# Patient Record
Sex: FEMALE | Race: WHITE | Employment: FULL TIME | URBAN - METROPOLITAN AREA
[De-identification: names, ages, dates, MRNs, and addresses within clinical notes are randomized per-mention and may not be internally consistent; named-entity substitution may affect disease eponyms.]

---

## 2020-02-13 ENCOUNTER — OFFICE VISIT (OUTPATIENT)
Dept: FAMILY MEDICINE CLINIC | Age: 60
End: 2020-02-13
Payer: COMMERCIAL

## 2020-02-13 VITALS — TEMPERATURE: 98.2 F | OXYGEN SATURATION: 98 % | HEART RATE: 82 BPM | BODY MASS INDEX: 21.26 KG/M2 | WEIGHT: 120 LBS

## 2020-02-13 LAB
INFLUENZA A ANTIBODY: NORMAL
INFLUENZA B ANTIBODY: NORMAL
S PYO AG THROAT QL: NORMAL

## 2020-02-13 PROCEDURE — 87804 INFLUENZA ASSAY W/OPTIC: CPT | Performed by: FAMILY MEDICINE

## 2020-02-13 PROCEDURE — 87880 STREP A ASSAY W/OPTIC: CPT | Performed by: FAMILY MEDICINE

## 2020-02-13 PROCEDURE — 99213 OFFICE O/P EST LOW 20 MIN: CPT | Performed by: FAMILY MEDICINE

## 2020-02-13 RX ORDER — GUAIFENESIN AND DEXTROMETHORPHAN HYDROBROMIDE 1200; 60 MG/1; MG/1
1 TABLET, EXTENDED RELEASE ORAL 2 TIMES DAILY
Qty: 14 TABLET | Refills: 1 | Status: SHIPPED | OUTPATIENT
Start: 2020-02-13

## 2020-02-13 RX ORDER — METHYLPREDNISOLONE 4 MG/1
TABLET ORAL
Qty: 1 KIT | Refills: 0 | Status: SHIPPED | OUTPATIENT
Start: 2020-02-13 | End: 2022-10-04 | Stop reason: ALTCHOICE

## 2020-02-13 RX ORDER — CEFDINIR 300 MG/1
300 CAPSULE ORAL 2 TIMES DAILY
Qty: 20 CAPSULE | Refills: 0 | Status: SHIPPED | OUTPATIENT
Start: 2020-02-13 | End: 2020-02-23

## 2020-02-13 RX ORDER — ATORVASTATIN CALCIUM 10 MG/1
TABLET, FILM COATED ORAL
COMMUNITY

## 2020-02-13 RX ORDER — CETIRIZINE HYDROCHLORIDE 10 MG/1
10 TABLET ORAL DAILY
Qty: 30 TABLET | Refills: 1 | Status: SHIPPED | OUTPATIENT
Start: 2020-02-13

## 2020-02-13 RX ORDER — HYDROCODONE BITARTRATE AND ACETAMINOPHEN 10; 325 MG/1; MG/1
TABLET ORAL
COMMUNITY
Start: 2019-11-09 | End: 2022-10-04 | Stop reason: ALTCHOICE

## 2020-02-13 ASSESSMENT — ENCOUNTER SYMPTOMS
SORE THROAT: 1
CHEST TIGHTNESS: 1
WHEEZING: 0
RHINORRHEA: 1
SINUS PAIN: 1
CHOKING: 0
SINUS PRESSURE: 1
COUGH: 1
SHORTNESS OF BREATH: 0

## 2020-02-13 NOTE — PROGRESS NOTES
20  Chase Rodas : 1960 Sex: female  Age: 62 y.o. Chief Complaint   Patient presents with    Cough     all sx 2d    Congestion    Drainage    Otalgia       24-year-old white female with a chief complaint of cough of 2 days duration nasal congestion postnasal drainage otalgia in both ears no nausea vomiting or dizziness. She does have myalgias no distress some chills fatigue and low-grade fever. Review of Systems   Constitutional: Positive for chills, fatigue and fever. HENT: Positive for congestion, ear pain, postnasal drip, rhinorrhea, sinus pressure, sinus pain, sneezing and sore throat. Respiratory: Positive for cough and chest tightness. Negative for choking, shortness of breath and wheezing. Cardiovascular: Negative. Current Outpatient Medications:     HYDROcodone-acetaminophen (NORCO)  MG per tablet, TAKE 1 TABLET BY MOUTH EVERY 4 HOURS AS NEEDED, Disp: , Rfl:     atorvastatin (LIPITOR) 10 MG tablet, Take by mouth, Disp: , Rfl:     acyclovir (ZOVIRAX) 200 MG capsule, TAKE ONE CAPSULE BY MOUTH EVERY 4 HOURS AS NEEDED, Disp: , Rfl: 0    atorvastatin (LIPITOR) 20 MG tablet, Take 20 mg by mouth daily. , Disp: , Rfl:   No Known Allergies    Past Medical History:   Diagnosis Date    Hyperlipemia      Past Surgical History:   Procedure Laterality Date    BREAST REDUCTION SURGERY      COSMETIC SURGERY      OVARIAN CYST REMOVAL      TONSILLECTOMY       No family history on file. Social History     Tobacco Use    Smoking status: Current Every Day Smoker     Packs/day: 0.50    Smokeless tobacco: Never Used   Substance Use Topics    Alcohol use: No     Comment: social    Drug use: No        Vitals:    20 1231   Pulse: 82   Temp: 98.2 °F (36.8 °C)   SpO2: 98%   Weight: 120 lb (54.4 kg)       Physical Exam  Constitutional:       Appearance: Normal appearance. HENT:      Head: Normocephalic and atraumatic.       Right Ear: Tympanic membrane, ear

## 2022-08-15 ENCOUNTER — TELEPHONE (OUTPATIENT)
Dept: VASCULAR SURGERY | Age: 62
End: 2022-08-15

## 2022-08-15 NOTE — TELEPHONE ENCOUNTER
Referral for JEEVAN received from Dr. Bob Abbott, message left on pt's voicemail for a return call to schedule.

## 2022-10-03 ENCOUNTER — TELEPHONE (OUTPATIENT)
Dept: VASCULAR SURGERY | Age: 62
End: 2022-10-03

## 2022-10-04 ENCOUNTER — OFFICE VISIT (OUTPATIENT)
Dept: VASCULAR SURGERY | Age: 62
End: 2022-10-04
Payer: COMMERCIAL

## 2022-10-04 VITALS — HEIGHT: 63 IN | WEIGHT: 125 LBS | BODY MASS INDEX: 22.15 KG/M2

## 2022-10-04 DIAGNOSIS — I70.1 STENOSIS OF LEFT RENAL ARTERY (HCC): Primary | ICD-10-CM

## 2022-10-04 PROCEDURE — 99203 OFFICE O/P NEW LOW 30 MIN: CPT | Performed by: SURGERY

## 2022-10-04 NOTE — PROGRESS NOTES
Vascular Surgery Outpatient Consultation      Chief Complaint   Patient presents with    Consultation     New pt. JEEVAN       Reason for Consult: Renal artery stenosis    Requesting Physician:  Dr. Cleveland Phillips:                The patient is a 57 y.o. female who is referred for evaluation of renal artery stenosis. She states a history of hypertension and as part of her work-up he underwent a renal ultrasound that suggested a moderate stenosis involving the left renal artery. She states that her blood pressure has been under good control now that she is on medications. She states that she is on 1 blood pressure medicine. Past Medical History:        Diagnosis Date    Hyperlipemia     JEEVAN (renal artery stenosis) (HCC)      Past Surgical History:        Procedure Laterality Date    BREAST REDUCTION SURGERY      COSMETIC SURGERY      OVARIAN CYST REMOVAL      TONSILLECTOMY       Current Medications:   Prior to Admission medications    Medication Sig Start Date End Date Taking? Authorizing Provider   atorvastatin (LIPITOR) 10 MG tablet Take by mouth   Yes Historical Provider, MD   cetirizine (ZYRTEC) 10 MG tablet Take 1 tablet by mouth daily 2/13/20  Yes Leslie Luna, DO   Dextromethorphan-guaiFENesin [facility] DM MAXIMUM STRENGTH)  MG TB12 Take 1 tablet by mouth 2 times daily 2/13/20  Yes Steve Paez, DO   acyclovir (ZOVIRAX) 200 MG capsule TAKE ONE CAPSULE BY MOUTH EVERY 4 HOURS AS NEEDED 3/9/17  Yes Historical Provider, MD   atorvastatin (LIPITOR) 20 MG tablet Take 20 mg by mouth daily. Yes Historical Provider, MD     Allergies:  Patient has no known allergies.     Social History     Socioeconomic History    Marital status: Single     Spouse name: Not on file    Number of children: Not on file    Years of education: Not on file    Highest education level: Not on file   Occupational History    Not on file   Tobacco Use    Smoking status: Every Day     Packs/day: 0.50 Types: Cigarettes    Smokeless tobacco: Never   Substance and Sexual Activity    Alcohol use: Yes     Comment: social    Drug use: No    Sexual activity: Yes     Partners: Male   Other Topics Concern    Not on file   Social History Narrative    Not on file     Social Determinants of Health     Financial Resource Strain: Not on file   Food Insecurity: Not on file   Transportation Needs: Not on file   Physical Activity: Not on file   Stress: Not on file   Social Connections: Not on file   Intimate Partner Violence: Not on file   Housing Stability: Not on file        No family history on file.     REVIEW OF SYSTEMS (New symptoms):    Eyes:      Blurred vision:  No [x]/Yes []               Diplopia:   No [x]/Yes []               Vision loss:       No [x]/Yes []   Ears, nose, throat:             Hearing loss:    No [x]/Yes []      Vertigo:   No [x]/Yes []                       Swallowing problem:  No [x]/Yes []               Nose bleeds:   No [x]/Yes []      Voice hoarseness:  No [x]/Yes []  Respiratory:             Cough:   No [x]/Yes []      Pleuritic chest pain:  No [x]/Yes []                        Dyspnea:   No [x]/Yes []      Wheezing:   No [x]/Yes []  Cardiovascular:             Angina:   No [x]/Yes []      Palpitations:   No [x]/Yes []          Claudication:    No [x]/Yes []      Leg swelling:   No [x]/Yes []  Gastrointestinal:             Nausea or vomiting:  No [x]/Yes []               Abdominal pain:  No [x]/Yes []                     Intestinal bleeding: No [x]/Yes []  Musculoskeletal:             Leg pain:   No [x]/Yes []      Back pain:   No [x]/Yes []                    Weakness:   No [x]/Yes []  Neurologic:             Numbness:   No [x]/Yes []      Paralysis:   No [x]/Yes []                       Headaches:   No [x]/Yes []  Hematologic, lymphatic:   Anemia:   No [x]/Yes []              Bleeding or bruising:  No [x]/Yes []              Fevers or chills: No [x]/Yes []  Endocrine:             Temp intolerance:   No [x]/Yes []                       Polydipsia, polyuria:  No [x]/Yes []  Skin:              Rash:    No [x]/Yes []      Ulcers:   No [x]/Yes []              Abnorm pigment: No [x]/Yes []  :              Frequency/urgency:  No [x]/Yes []      Hematuria:    No [x]/Yes []                      Incontinence:    No [x]/Yes []    PHYSICAL EXAM:  There were no vitals filed for this visit. General Appearance: alert and oriented to person, place and time, well developed and well- nourished, in no acute distress  Skin: warm and dry, no rash or erythema  Head: normocephalic and atraumatic  Eyes: extraocular eye movements intact, conjunctivae normal  ENT: external ear and ear canal normal bilaterally, nose without deformity  Pulmonary/Chest: clear to auscultation bilaterally- no wheezes, rales or rhonchi, normal air movement, no respiratory distress  Cardiovascular: normal rate, regular rhythm, normal S1 and S2, no murmurs, no carotid bruits  Abdomen: soft, non-tender, non-distended, normal bowel sounds, no masses or organomegaly  Musculoskeletal: normal range of motion, no joint swelling, deformity or tenderness  Neurologic: no cranial nerve deficit, gait, coordination and speech normal  Extremities: no leg edema bilaterally    PULSE EXAM      Right      Left   Brachial     Radial 2 2   Femoral     Popliteal     Dorsalis Pedis     Posterior Tibial     (3=normal, 2=diminished, 1=barely palpable, 4=widened)      Problem List Items Addressed This Visit       Stenosis of left renal artery (HCC) - Primary    Relevant Orders    US RETROPERITONEAL JEEVAN         I reviewed the ultrasound and CAT scan from [facility].  The patient does have a stenosis at the origin of the left renal artery however does not appear severe. I reviewed with the patient that I do not recommend any additional testing or intervention currently.   As she is well controlled on one blood pressure medication and has no renal dysfunction, she does not require catheter directed arteriogram.    I reviewed with her that if she has new difficulty controlling her blood pressure or changes in her renal function, we can always consider an arteriogram at that time. She understands and agrees. No follow-ups on file.

## 2023-08-28 ENCOUNTER — TELEPHONE (OUTPATIENT)
Dept: VASCULAR SURGERY | Age: 63
End: 2023-08-28

## 2023-08-28 NOTE — TELEPHONE ENCOUNTER
Spoke with the pt to schedule a renal artery duplex prior to her next office visit with Dr. Shanta Cuevas. Due to financial reasons, she did not wish to proceed with duplex and cancelled her follow up with Dr. Shanta Cuevas stating she would call to reschedule.

## 2024-02-13 ENCOUNTER — APPOINTMENT (OUTPATIENT)
Dept: GENERAL RADIOLOGY | Age: 64
End: 2024-02-13
Payer: MEDICARE

## 2024-02-13 ENCOUNTER — APPOINTMENT (OUTPATIENT)
Dept: CT IMAGING | Age: 64
End: 2024-02-13
Payer: MEDICARE

## 2024-02-13 ENCOUNTER — HOSPITAL ENCOUNTER (EMERGENCY)
Age: 64
Discharge: HOME | End: 2024-02-14
Attending: EMERGENCY MEDICINE | Admitting: INTERNAL MEDICINE
Payer: MEDICARE

## 2024-02-13 ENCOUNTER — APPOINTMENT (OUTPATIENT)
Dept: MRI IMAGING | Age: 64
End: 2024-02-13
Payer: MEDICARE

## 2024-02-13 DIAGNOSIS — R29.810 FACIAL DROOP: Primary | ICD-10-CM

## 2024-02-13 DIAGNOSIS — G51.0 BELL'S PALSY: ICD-10-CM

## 2024-02-13 LAB
ALBUMIN SERPL-MCNC: 4.1 G/DL (ref 3.5–5.2)
ALP SERPL-CCNC: 61 U/L (ref 35–104)
ALT SERPL-CCNC: 28 U/L (ref 0–32)
ANION GAP SERPL CALCULATED.3IONS-SCNC: 11 MMOL/L (ref 7–16)
AST SERPL-CCNC: 32 U/L (ref 0–31)
BASOPHILS # BLD: 0.04 K/UL (ref 0–0.2)
BASOPHILS NFR BLD: 0 % (ref 0–2)
BILIRUB SERPL-MCNC: 0.5 MG/DL (ref 0–1.2)
BUN SERPL-MCNC: 15 MG/DL (ref 6–23)
CALCIUM SERPL-MCNC: 8.1 MG/DL (ref 8.6–10.2)
CHLORIDE SERPL-SCNC: 101 MMOL/L (ref 98–107)
CO2 SERPL-SCNC: 23 MMOL/L (ref 22–29)
CREAT SERPL-MCNC: 0.6 MG/DL (ref 0.5–1)
EKG ATRIAL RATE: 93 BPM
EKG P AXIS: 49 DEGREES
EKG P-R INTERVAL: 146 MS
EKG Q-T INTERVAL: 390 MS
EKG QRS DURATION: 78 MS
EKG QTC CALCULATION (BAZETT): 484 MS
EKG R AXIS: 37 DEGREES
EKG T AXIS: 39 DEGREES
EKG VENTRICULAR RATE: 93 BPM
EOSINOPHIL # BLD: 0.06 K/UL (ref 0.05–0.5)
EOSINOPHILS RELATIVE PERCENT: 1 % (ref 0–6)
ERYTHROCYTE [DISTWIDTH] IN BLOOD BY AUTOMATED COUNT: 14.3 % (ref 11.5–15)
GFR SERPL CREATININE-BSD FRML MDRD: >60 ML/MIN/1.73M2
GLUCOSE BLD-MCNC: 108 MG/DL (ref 74–99)
GLUCOSE SERPL-MCNC: 103 MG/DL (ref 74–99)
HCT VFR BLD AUTO: 34.9 % (ref 34–48)
HGB BLD-MCNC: 11.6 G/DL (ref 11.5–15.5)
IMM GRANULOCYTES # BLD AUTO: <0.03 K/UL (ref 0–0.58)
IMM GRANULOCYTES NFR BLD: 0 % (ref 0–5)
LYMPHOCYTES NFR BLD: 2.64 K/UL (ref 1.5–4)
LYMPHOCYTES RELATIVE PERCENT: 28 % (ref 20–42)
MCH RBC QN AUTO: 31.3 PG (ref 26–35)
MCHC RBC AUTO-ENTMCNC: 33.2 G/DL (ref 32–34.5)
MCV RBC AUTO: 94.1 FL (ref 80–99.9)
MONOCYTES NFR BLD: 0.74 K/UL (ref 0.1–0.95)
MONOCYTES NFR BLD: 8 % (ref 2–12)
NEUTROPHILS NFR BLD: 63 % (ref 43–80)
NEUTS SEG NFR BLD: 5.99 K/UL (ref 1.8–7.3)
PLATELET # BLD AUTO: 231 K/UL (ref 130–450)
PMV BLD AUTO: 12.4 FL (ref 7–12)
POTASSIUM SERPL-SCNC: 3.3 MMOL/L (ref 3.5–5)
PROT SERPL-MCNC: 6.4 G/DL (ref 6.4–8.3)
RBC # BLD AUTO: 3.71 M/UL (ref 3.5–5.5)
SODIUM SERPL-SCNC: 135 MMOL/L (ref 132–146)
TROPONIN I SERPL HS-MCNC: <6 NG/L (ref 0–9)
WBC OTHER # BLD: 9.5 K/UL (ref 4.5–11.5)

## 2024-02-13 PROCEDURE — 6360000004 HC RX CONTRAST MEDICATION: Performed by: RADIOLOGY

## 2024-02-13 PROCEDURE — 93010 ELECTROCARDIOGRAM REPORT: CPT | Performed by: INTERNAL MEDICINE

## 2024-02-13 PROCEDURE — 99285 EMERGENCY DEPT VISIT HI MDM: CPT

## 2024-02-13 PROCEDURE — 6360000002 HC RX W HCPCS: Performed by: EMERGENCY MEDICINE

## 2024-02-13 PROCEDURE — 6370000000 HC RX 637 (ALT 250 FOR IP)

## 2024-02-13 PROCEDURE — 70498 CT ANGIOGRAPHY NECK: CPT

## 2024-02-13 PROCEDURE — 70496 CT ANGIOGRAPHY HEAD: CPT

## 2024-02-13 PROCEDURE — 85025 COMPLETE CBC W/AUTO DIFF WBC: CPT

## 2024-02-13 PROCEDURE — 82962 GLUCOSE BLOOD TEST: CPT

## 2024-02-13 PROCEDURE — 84484 ASSAY OF TROPONIN QUANT: CPT

## 2024-02-13 PROCEDURE — 80053 COMPREHEN METABOLIC PANEL: CPT

## 2024-02-13 PROCEDURE — 70450 CT HEAD/BRAIN W/O DYE: CPT

## 2024-02-13 PROCEDURE — 70551 MRI BRAIN STEM W/O DYE: CPT

## 2024-02-13 PROCEDURE — 93005 ELECTROCARDIOGRAM TRACING: CPT

## 2024-02-13 PROCEDURE — 71045 X-RAY EXAM CHEST 1 VIEW: CPT

## 2024-02-13 PROCEDURE — 96376 TX/PRO/DX INJ SAME DRUG ADON: CPT

## 2024-02-13 PROCEDURE — 96374 THER/PROPH/DIAG INJ IV PUSH: CPT

## 2024-02-13 RX ORDER — ONDANSETRON 2 MG/ML
4 INJECTION INTRAMUSCULAR; INTRAVENOUS EVERY 6 HOURS PRN
OUTPATIENT
Start: 2024-02-13

## 2024-02-13 RX ORDER — LORAZEPAM 2 MG/ML
0.5 INJECTION INTRAMUSCULAR ONCE
Status: COMPLETED | OUTPATIENT
Start: 2024-02-13 | End: 2024-02-13

## 2024-02-13 RX ORDER — SODIUM CHLORIDE 0.9 % (FLUSH) 0.9 %
10 SYRINGE (ML) INJECTION ONCE
Status: DISCONTINUED | OUTPATIENT
Start: 2024-02-13 | End: 2024-02-13

## 2024-02-13 RX ORDER — VALACYCLOVIR HYDROCHLORIDE 1 G/1
TABLET, FILM COATED ORAL
COMMUNITY
Start: 2024-02-05 | End: 2024-02-13 | Stop reason: ALTCHOICE

## 2024-02-13 RX ORDER — ASPIRIN 81 MG/1
162 TABLET ORAL DAILY
Status: DISCONTINUED | OUTPATIENT
Start: 2024-02-14 | End: 2024-02-14 | Stop reason: HOSPADM

## 2024-02-13 RX ORDER — ASPIRIN 81 MG/1
81 TABLET, CHEWABLE ORAL DAILY
OUTPATIENT
Start: 2024-02-13

## 2024-02-13 RX ORDER — CLOPIDOGREL BISULFATE 75 MG/1
75 TABLET ORAL DAILY
Status: DISCONTINUED | OUTPATIENT
Start: 2024-02-14 | End: 2024-02-14 | Stop reason: HOSPADM

## 2024-02-13 RX ORDER — ASPIRIN 300 MG/1
300 SUPPOSITORY RECTAL DAILY
OUTPATIENT
Start: 2024-02-13

## 2024-02-13 RX ORDER — SODIUM CHLORIDE 9 MG/ML
INJECTION, SOLUTION INTRAVENOUS PRN
OUTPATIENT
Start: 2024-02-13

## 2024-02-13 RX ORDER — POLYETHYLENE GLYCOL 3350 17 G/17G
17 POWDER, FOR SOLUTION ORAL DAILY PRN
OUTPATIENT
Start: 2024-02-13

## 2024-02-13 RX ORDER — ONDANSETRON 4 MG/1
4 TABLET, ORALLY DISINTEGRATING ORAL EVERY 8 HOURS PRN
OUTPATIENT
Start: 2024-02-13

## 2024-02-13 RX ORDER — SODIUM CHLORIDE 9 MG/ML
INJECTION, SOLUTION INTRAVENOUS PRN
Status: DISCONTINUED | OUTPATIENT
Start: 2024-02-13 | End: 2024-02-13

## 2024-02-13 RX ORDER — ATORVASTATIN CALCIUM 20 MG/1
20 TABLET, FILM COATED ORAL DAILY
OUTPATIENT
Start: 2024-02-13

## 2024-02-13 RX ORDER — CLOPIDOGREL BISULFATE 75 MG/1
300 TABLET ORAL ONCE
Status: COMPLETED | OUTPATIENT
Start: 2024-02-13 | End: 2024-02-13

## 2024-02-13 RX ORDER — SODIUM CHLORIDE 0.9 % (FLUSH) 0.9 %
5-40 SYRINGE (ML) INJECTION PRN
Status: DISCONTINUED | OUTPATIENT
Start: 2024-02-13 | End: 2024-02-13

## 2024-02-13 RX ORDER — ROSUVASTATIN CALCIUM 20 MG/1
20 TABLET, COATED ORAL EVERY MORNING
COMMUNITY

## 2024-02-13 RX ORDER — ENOXAPARIN SODIUM 100 MG/ML
40 INJECTION SUBCUTANEOUS DAILY
OUTPATIENT
Start: 2024-02-13

## 2024-02-13 RX ORDER — ASPIRIN 325 MG
325 TABLET, DELAYED RELEASE (ENTERIC COATED) ORAL ONCE
Status: COMPLETED | OUTPATIENT
Start: 2024-02-13 | End: 2024-02-13

## 2024-02-13 RX ORDER — SODIUM CHLORIDE 0.9 % (FLUSH) 0.9 %
5-40 SYRINGE (ML) INJECTION EVERY 12 HOURS SCHEDULED
OUTPATIENT
Start: 2024-02-13

## 2024-02-13 RX ORDER — SODIUM CHLORIDE 0.9 % (FLUSH) 0.9 %
5-40 SYRINGE (ML) INJECTION EVERY 12 HOURS SCHEDULED
Status: DISCONTINUED | OUTPATIENT
Start: 2024-02-13 | End: 2024-02-13

## 2024-02-13 RX ORDER — VALSARTAN 160 MG/1
160 TABLET ORAL EVERY MORNING
COMMUNITY

## 2024-02-13 RX ORDER — SODIUM CHLORIDE 0.9 % (FLUSH) 0.9 %
5-40 SYRINGE (ML) INJECTION PRN
OUTPATIENT
Start: 2024-02-13

## 2024-02-13 RX ADMIN — POTASSIUM BICARBONATE 20 MEQ: 782 TABLET, EFFERVESCENT ORAL at 13:09

## 2024-02-13 RX ADMIN — ASPIRIN 325 MG: 325 TABLET, COATED ORAL at 12:50

## 2024-02-13 RX ADMIN — LORAZEPAM 0.5 MG: 2 INJECTION INTRAMUSCULAR; INTRAVENOUS at 23:10

## 2024-02-13 RX ADMIN — CLOPIDOGREL BISULFATE 300 MG: 75 TABLET ORAL at 12:50

## 2024-02-13 RX ADMIN — IOPAMIDOL 75 ML: 755 INJECTION, SOLUTION INTRAVENOUS at 11:55

## 2024-02-13 RX ADMIN — LORAZEPAM 0.5 MG: 2 INJECTION INTRAMUSCULAR; INTRAVENOUS at 15:27

## 2024-02-13 ASSESSMENT — LIFESTYLE VARIABLES
HOW OFTEN DO YOU HAVE A DRINK CONTAINING ALCOHOL: 2-4 TIMES A MONTH
HOW MANY STANDARD DRINKS CONTAINING ALCOHOL DO YOU HAVE ON A TYPICAL DAY: 1 OR 2

## 2024-02-13 NOTE — ED PROVIDER NOTES
[facility] EMERGENCY DEPARTMENT  EMERGENCY DEPARTMENT ENCOUNTER        Pt Name: Sandra L Devicchio  MRN: [id number]  Birthdate 1960  Date of evaluation: 2/13/2024  Provider: Alan Tomlinson DO  PCP: Jaquan Louise DO  Note Started: 12:02 PM EST 2/13/24    CHIEF COMPLAINT       Chief Complaint   Patient presents with    Aphasia    Facial Droop     slurred speech. facial droop. LKW 1hr PTA       HISTORY OF PRESENT ILLNESS: 1 or more Elements   History From: Patient and friend present    Limitations to history : None    Sandra L Devicchio is a 63 y.o. female who presents to the emergency department with chief complaint of facial drooping and slurred speech.  Patient states that started about 45 minutes prior to arrival in the emergency department.  She states that it is left sided mouth drooping as well as little bit of slurring of her speech.  She has not had this before.  Patient does not take any blood thinners and has not had any recent falls or head injuries.  Patient otherwise has no other symptoms including numbness, weakness, or balance difficulties.    Patient denies any headache, lightheadedness or dizziness, fever or chills, nausea or vomiting, chest pain, shortness of breath, abdominal pain, hematuria or dysuria, constipation or diarrhea.    Nursing Notes were all reviewed and agreed with or any disagreements were addressed in the HPI.    REVIEW OF SYSTEMS :      Positives and Pertinent negatives as per HPI.     PAST MEDICAL HISTORY/Chronic Conditions Affecting Care      has a past medical history of Hyperlipemia and JEEVAN (renal artery stenosis) (HCC).     SURGICAL HISTORY     Past Surgical History:   Procedure Laterality Date    BREAST REDUCTION SURGERY      COSMETIC SURGERY      OVARIAN CYST REMOVAL      TONSILLECTOMY         CURRENTMEDICATIONS       Previous Medications    ACYCLOVIR (ZOVIRAX) 200 MG CAPSULE    TAKE ONE CAPSULE BY MOUTH EVERY 4 HOURS AS NEEDED     drooping that started approximately an hour prior to arrival.  Patient has not had the symptoms before.  She does not take any anticoagulation.  NIH at arrival is 3 for slurred speech and facial drooping.  Stroke alert is called and patient is taken to CT's.  No acute intracranial hemorrhage evident and spoke with Dr. Phipps, teleneurology, who at this time initially recommends TNK and then on evaluating patient recommends no TNK or thrombectomy but does recommend loading with aspirin and Plavix and plan for MRI and transfer to [facility] for neurology evaluation.  Patient has remained hemodynamically stable throughout the emergency department course.  Mild hypokalemia and patient is given potassium replacement.  CBC and CMP normal with normal troponin and EKG.  Chest x-ray normal and CT head and neck normal as well.  Spoke with hospitalist who accepts patient for transfer.    Extensively reviewed laboratory and imaging findings with patient/family members/available representative parties and all questions answered at this time to the fullest extent possible.  Shared decision making utilized with patient and present parties, and due to their presenting conditions patient to be admitted to the hospital for inpatient management and monitoring.  Patient has remained closely monitored with multiple reevaluations and complex medical decision making throughout the course of their emergency department stay.  They have clinically remained stable and have hemodynamically remained stable as well.  After emergency department management is complete, patient to be admitted to the hospital.    Differential diagnosis includes but is not limited to: Acute intracranial hemorrhage, cerebrovascular accident, metabolic encephalopathy, transient ischemic attack    Please refer to the ED Course as available for additional MDM.  ED Course as of 02/13/24 1611   Tue Feb 13, 2024   1149 NIH 3 - last known well 11AM [RW]   1212

## 2024-02-13 NOTE — ED NOTES
Spoke with Dr. Morfin about ordering home medication. He is deferring ordering home medications to the admitting team.

## 2024-02-13 NOTE — ED NOTES
Spoke with Dr. Tomlinson about needing order to continue plavix while patient is in our facility. Medication ordered

## 2024-02-13 NOTE — ED NOTES
Notified Soledad in MRI patient has pre-meds ordered and is ready for MRI. She will call when they are ready for patient.

## 2024-02-13 NOTE — ED NOTES
Radiology Procedure Waiver   Name: Sandra L Devicchio  : 1960  MRN: [id number]    Date:  24    Time: 11:47 AM EST    Benefits of immediately proceeding with Radiology exam(s) without pre-testing outweigh the risks or are not indicated as specified below and therefore the following is/are being waived:    [] Pregnancy test   [] Patients LMP on-time and regular.   [] Patient had Tubal Ligation or has other Contraception Device.   [] Patient  is Menopausal or Premenarcheal.    [] Patient had Full or Partial Hysterectomy.    [] Protocol for Iodine allergy    [] MRI Questionnaire     [x] BUN/Creatinine   [] Patient age w/no hx of renal dysfunction.   [] Patient on Dialysis.   [] Recent Normal Labs.  Electronically signed by Alan Tomlinson DO on 24 at 11:47 AM EST

## 2024-02-14 VITALS
DIASTOLIC BLOOD PRESSURE: 57 MMHG | TEMPERATURE: 98.4 F | HEIGHT: 63 IN | SYSTOLIC BLOOD PRESSURE: 108 MMHG | WEIGHT: 126 LBS | BODY MASS INDEX: 22.32 KG/M2 | OXYGEN SATURATION: 98 % | RESPIRATION RATE: 18 BRPM | HEART RATE: 61 BPM

## 2024-02-14 PROCEDURE — 6370000000 HC RX 637 (ALT 250 FOR IP)

## 2024-02-14 RX ORDER — ACYCLOVIR 800 MG/1
800 TABLET ORAL 4 TIMES DAILY
Qty: 40 TABLET | Refills: 0 | Status: SHIPPED | OUTPATIENT
Start: 2024-02-14 | End: 2024-02-24

## 2024-02-14 RX ORDER — METHYLPREDNISOLONE 4 MG/1
TABLET ORAL
Qty: 21 TABLET | Refills: 0 | Status: SHIPPED | OUTPATIENT
Start: 2024-02-14 | End: 2024-02-20

## 2024-02-14 RX ADMIN — ASPIRIN 162 MG: 81 TABLET, COATED ORAL at 08:53

## 2024-02-14 RX ADMIN — CLOPIDOGREL BISULFATE 75 MG: 75 TABLET ORAL at 08:53

## 2024-02-14 NOTE — ED NOTES
Patient offered breakfast tray. Patient declined at this time. RN provided coffee for patient and patient told to let RN know if she changes her mind about breakfast.

## 2024-02-14 NOTE — H&P
[facility]  History and Physical      CHIEF COMPLAINT:    Chief Complaint   Patient presents with    Aphasia    Facial Droop     slurred speech. facial droop. LKW 1hr PTA        Patient of Jaquan Louise DO presents with:  <principal problem not specified>    History of Present Illness:   Patient is a 63-year-old female with a past medical history of HLD and hypertension who presents to the emergency room for aphasia with facial drooping prompting her to come to the emergency room for further evaluation.  On arrival patient had a series of CT scans which revealed no acute infarct/hemorrhage or LVO.  Patient was a telestroke in which neurology recommended MRI of the brain and transfer to [place] for general neurology consult.  Patient was mildly hypertensive on arrival at 164/91.  Labs are relatively unremarkable on arrival.      REVIEW OF SYSTEMS:  Pertinent negatives are above in HPI.  10 point ROS otherwise negative.      Past Medical History:   Diagnosis Date    Hyperlipemia     JEEVAN (renal artery stenosis) (HCC)          Past Surgical History:   Procedure Laterality Date    BREAST REDUCTION SURGERY      COSMETIC SURGERY      OVARIAN CYST REMOVAL      TONSILLECTOMY         Medications Prior to Admission:    Not in a hospital admission.    Note that the patient's home medications were reviewed and the above list is accurate to the best of my knowledge at the time of the exam.    Allergies:    Patient has no known allergies.    Social History:    reports that she has been smoking cigarettes. She has never used smokeless tobacco. She reports current alcohol use. She reports that she does not use drugs.    Family History:   family history is not on file.      PHYSICAL EXAM:    Vitals:  BP (!) 108/57   Pulse 61   Temp 98.4 °F (36.9 °C)   Resp 18   Ht 1.6 m (5' 3\")   Wt 57.2 kg (126 lb)   SpO2 98%   BMI 22.32 kg/m²       General appearance: NAD, conversant  Eyes: Sclerae anicteric,  PERRLA  HEENT: AT/NC, MMM  Neck: FROM, supple, no thyromegaly  Lymph: No cervical / supraclavicular lymphadenopathy  Lungs: Clear to auscultation, WOB normal  CV: RRR, no MRGs, no lower extremity edema  Abdomen: Soft, non-tender; no masses or HSM, +BS  Extremities: FROM without synovitis.  No clubbing or cyanosis of the hands.  Skin: no rash, induration, lesions, or ulcers  Psych: Calm and cooperative.  Normal judgement and insight.  Normal mood and affect.  Neuro: Alert and interactive, face symmetric, speech fluent.    LABS:  All labs reviewed.  Of note:  CBC:   Lab Results   Component Value Date/Time    WBC 9.5 02/13/2024 12:01 PM    RBC 3.71 02/13/2024 12:01 PM    HGB 11.6 02/13/2024 12:01 PM    HCT 34.9 02/13/2024 12:01 PM    MCV 94.1 02/13/2024 12:01 PM    MCH 31.3 02/13/2024 12:01 PM    MCHC 33.2 02/13/2024 12:01 PM    RDW 14.3 02/13/2024 12:01 PM     02/13/2024 12:01 PM    MPV 12.4 02/13/2024 12:01 PM     CMP:    Lab Results   Component Value Date/Time     02/13/2024 12:01 PM    K 3.3 02/13/2024 12:01 PM     02/13/2024 12:01 PM    CO2 23 02/13/2024 12:01 PM    BUN 15 02/13/2024 12:01 PM    CREATININE 0.6 02/13/2024 12:01 PM    LABGLOM >60 02/13/2024 12:01 PM    GLUCOSE 103 02/13/2024 12:01 PM    PROT 6.4 02/13/2024 12:01 PM    LABALBU 4.1 02/13/2024 12:01 PM    CALCIUM 8.1 02/13/2024 12:01 PM    BILITOT 0.5 02/13/2024 12:01 PM    ALKPHOS 61 02/13/2024 12:01 PM    AST 32 02/13/2024 12:01 PM    ALT 28 02/13/2024 12:01 PM       Imaging:  CTA head/neck: No LVO, 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   CT head negative  MRI of the brain negative for acute infarcts    EKG:  Normal sinus rhythm    Telemetry:  I've personally reviewed the patient's telemetry:  ***    ASSESSMENT/PLAN:  Active Problems:    * No active hospital problems. *  Resolved Problems:    * No resolved hospital problems. *    Patient is a 63-year-old female admitted to [facility] for  Strokelike

## 2024-02-15 NOTE — CONSULTS
[place] [address]                                  CONSULTATION    PATIENT NAME: DEVICCHIO, SANDRA L                 :        1960  MED REC NO:   [id number]                            ROOM:       03  ACCOUNT NO:   [id number]                           ADMIT DATE: 2024  PROVIDER:     Jaquan Louise DO    CONSULT DATE:  2024    CHIEF COMPLAINT:  Right-sided facial droop.    HISTORY OF PRESENT ILLNESS:  This is a 63-year-old female who presented  to [place] [facility] the night before my examination with  acute onset of right-sided facial droop with watering of her right eye.   There was concern obviously for an acute neurological event, therefore  workup while in the emergency room consisted initially of CT scan of the  head, which showed no acute intracranial abnormality, specifically no  acute intracranial hemorrhage.  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  Laboratory studies included CBC, which revealed a normal white  cell count of 9.5 with hemoglobin and hematocrit stable at 11.6 and 34.9  respectively.  Her CMP did show a slight decrease in potassium at 3.3  with glucose slightly high at 103 and calcium slightly low at 8.1.   Remainder of CMP essentially within normal limits.  Her troponin was  less than 6.  Chest x-ray was showing no acute cardiopulmonary process.   Ultimately MRI of the brain would be ordered, which showed no acute  intracranial abnormality.  Findings of a normal-appearing brain for the  patient's age and only mild age appropriate atrophy and mild age  appropriate small vessel ischemic changes.  EKG revealed normal sinus  rhythm and according to Cardiology was showing normal EKG.  She did have  rises in blood pressure  upon arriving at the emergency room, however,  her blood pressure did stabilize.  Initially it was felt that this  patient maybe suffering from TIA and arrangements were made for transfer  to [facility] for a neurological input.  I was asked to see the patient  because transfer was held secondary to lack of available beds.  I saw  her while still at [facility].  This patient was walking around  effortlessly.  She did tell me that upon initial presentation, she had  difficulty with a few words, but felt that this was secondary to the  swelling that she had in her lip.  She otherwise denied having had any  chest pain, shortness of breath, abdominal pain, nausea, vomiting,  headaches, or dizziness.  She had no blurred vision.  No recent fever or  chills or signs of infection and states that she was feeling well prior  to and was feeling well at the time of my examination.  She only states  that she had watering in her eye and noticed that the right side of her  face was drooping, had no pain preceding this.  She did, however, have  what was an upper respiratory infection approximately two weeks ago.   She was very anxious to be discharged and it appeared fairly evident  that her symptoms were consistent with Bell's palsy, although it did  seem appropriate that initially ruling out acute cerebrovascular event  was necessary.  Nonetheless, the patient stated that she felt completely  normal other than her facial droop and therefore I felt that she was  appropriate for discharge.    PAST MEDICAL HISTORY:  Consists of hyperlipidemia and renal artery  stenosis.    PAST SURGICAL HISTORY:  Breast reduction surgery, cosmetics surgery of  the face, ovarian cyst removal, and tonsillectomy.    MEDICATIONS:  She typically takes Diovan, Crestor, and was given aspirin  and Plavix while in the emergency room.    SOCIAL HISTORY:  Current everyday smoker of half pack per year.   Uncertain as to her start day.  Does drink alcohol

## 2025-01-29 ENCOUNTER — OFFICE VISIT (OUTPATIENT)
Dept: PODIATRY | Age: 65
End: 2025-01-29
Payer: MEDICARE

## 2025-01-29 VITALS
OXYGEN SATURATION: 99 % | WEIGHT: 134 LBS | DIASTOLIC BLOOD PRESSURE: 84 MMHG | HEART RATE: 66 BPM | SYSTOLIC BLOOD PRESSURE: 137 MMHG | BODY MASS INDEX: 23.74 KG/M2 | TEMPERATURE: 97.6 F

## 2025-01-29 DIAGNOSIS — M20.41 HAMMER TOES OF BOTH FEET: ICD-10-CM

## 2025-01-29 DIAGNOSIS — M20.42 HAMMER TOES OF BOTH FEET: ICD-10-CM

## 2025-01-29 DIAGNOSIS — G57.62 MORTON'S NEUROMA OF LEFT FOOT: ICD-10-CM

## 2025-01-29 DIAGNOSIS — M79.672 PAIN IN LEFT FOOT: Primary | ICD-10-CM

## 2025-01-29 PROCEDURE — 1123F ACP DISCUSS/DSCN MKR DOCD: CPT | Performed by: PODIATRIST

## 2025-01-29 PROCEDURE — 1159F MED LIST DOCD IN RCRD: CPT | Performed by: PODIATRIST

## 2025-01-29 PROCEDURE — 99203 OFFICE O/P NEW LOW 30 MIN: CPT | Performed by: PODIATRIST

## 2025-01-29 PROCEDURE — 64455 NJX AA&/STRD PLTR COM DG NRV: CPT | Performed by: PODIATRIST

## 2025-01-29 RX ORDER — BETAMETHASONE SODIUM PHOSPHATE AND BETAMETHASONE ACETATE 3; 3 MG/ML; MG/ML
1 INJECTION, SUSPENSION INTRA-ARTICULAR; INTRALESIONAL; INTRAMUSCULAR; SOFT TISSUE ONCE
Status: COMPLETED | OUTPATIENT
Start: 2025-01-29 | End: 2025-01-29

## 2025-01-29 RX ORDER — MELOXICAM 15 MG/1
15 TABLET ORAL DAILY
Qty: 30 TABLET | Refills: 3 | Status: SHIPPED | OUTPATIENT
Start: 2025-01-29

## 2025-01-29 RX ORDER — BUPIVACAINE HYDROCHLORIDE 5 MG/ML
1 INJECTION, SOLUTION PERINEURAL ONCE
Status: COMPLETED | OUTPATIENT
Start: 2025-01-29 | End: 2025-01-29

## 2025-01-29 RX ADMIN — BUPIVACAINE HYDROCHLORIDE 1 MG: 5 INJECTION, SOLUTION PERINEURAL at 15:57

## 2025-01-29 RX ADMIN — BETAMETHASONE SODIUM PHOSPHATE AND BETAMETHASONE ACETATE 1 MG: 3; 3 INJECTION, SUSPENSION INTRA-ARTICULAR; INTRALESIONAL; INTRAMUSCULAR; SOFT TISSUE at 15:56

## 2025-04-03 ENCOUNTER — OFFICE VISIT (OUTPATIENT)
Dept: PODIATRY | Age: 65
End: 2025-04-03
Payer: MEDICARE

## 2025-04-03 VITALS
BODY MASS INDEX: 23.74 KG/M2 | WEIGHT: 134 LBS | DIASTOLIC BLOOD PRESSURE: 81 MMHG | HEIGHT: 63 IN | SYSTOLIC BLOOD PRESSURE: 149 MMHG | HEART RATE: 80 BPM

## 2025-04-03 DIAGNOSIS — G57.62 MORTON'S NEUROMA OF LEFT FOOT: Primary | ICD-10-CM

## 2025-04-03 PROCEDURE — 1123F ACP DISCUSS/DSCN MKR DOCD: CPT | Performed by: PODIATRIST

## 2025-04-03 PROCEDURE — 99212 OFFICE O/P EST SF 10 MIN: CPT | Performed by: PODIATRIST

## 2025-04-03 PROCEDURE — 1159F MED LIST DOCD IN RCRD: CPT | Performed by: PODIATRIST

## 2025-04-04 NOTE — PROGRESS NOTES
Health     Financial Resource Strain: Not on file   Food Insecurity: Not on file   Transportation Needs: Not on file   Physical Activity: Not on file   Stress: Not on file   Social Connections: Not on file   Intimate Partner Violence: Not on file   Housing Stability: Not on file       Vitals:    04/03/25 1525   BP: (!) 149/81   Pulse: 80   Weight: 60.8 kg (134 lb)   Height: 1.6 m (5' 3\")       Focused Lower Extremity Physical Exam:  Vitals:    04/03/25 1525   BP: (!) 149/81   Pulse: 80        Foot Exam    General  General Appearance: appears stated age and healthy   Orientation: alert and oriented to person, place, and time       Left Foot/Ankle      Inspection and Palpation  Tenderness: none   Swelling: none   Hammertoes: second toe  Claw toes: second toe  Skin Exam: skin intact;     Neurovascular  Dorsalis pedis: 3+  Posterior tibial: 3+  Saphenous nerve sensation: normal  Tibial nerve sensation: normal  Superficial peroneal nerve sensation: normal  Deep peroneal nerve sensation: normal  Sural nerve sensation: normal  Achilles reflex: 2+  Babinski reflex: 2+    Muscle Strength  Ankle dorsiflexion: 5  Ankle plantar flexion: 5  Ankle inversion: 5  Ankle eversion: 5  Great toe extension: 5  Great toe flexion: 5           Left Ankle Exam     Muscle Strength   The patient has normal left ankle strength.  Dorsiflexion:  5/5   Plantar flexion:  5/5             Vascular: pulses  dp  pt palpable  Capillary Refill Time:   Hair growth  Skin:    Edema:    Neurologic:      Musculoskeletal/ Orthopedic examination: X-rays were taken showing a splaying of the third and fourth digit left foot.  NAIL   Web space negative pain pain with palpation to the third webspace left foot positive Kaye sign  Derm:          Assessment and Plan: Today we will hold off on any injections forefoot padding I did debride a small HM this was noninfected she is to reappoint as needed.     Mckenna was seen today for follow-up and

## 2025-05-22 ENCOUNTER — OFFICE VISIT (OUTPATIENT)
Dept: PODIATRY | Age: 65
End: 2025-05-22
Payer: MEDICARE

## 2025-05-22 VITALS
DIASTOLIC BLOOD PRESSURE: 94 MMHG | WEIGHT: 134 LBS | BODY MASS INDEX: 23.74 KG/M2 | OXYGEN SATURATION: 90 % | TEMPERATURE: 98.2 F | HEART RATE: 84 BPM | SYSTOLIC BLOOD PRESSURE: 164 MMHG

## 2025-05-22 DIAGNOSIS — M20.42 HAMMER TOES OF BOTH FEET: ICD-10-CM

## 2025-05-22 DIAGNOSIS — M20.41 HAMMER TOES OF BOTH FEET: ICD-10-CM

## 2025-05-22 DIAGNOSIS — M79.672 PAIN IN LEFT FOOT: ICD-10-CM

## 2025-05-22 DIAGNOSIS — G57.62 MORTON'S NEUROMA OF LEFT FOOT: Primary | ICD-10-CM

## 2025-05-22 PROCEDURE — 1123F ACP DISCUSS/DSCN MKR DOCD: CPT | Performed by: PODIATRIST

## 2025-05-22 PROCEDURE — 99212 OFFICE O/P EST SF 10 MIN: CPT | Performed by: PODIATRIST

## 2025-05-22 PROCEDURE — 1159F MED LIST DOCD IN RCRD: CPT | Performed by: PODIATRIST

## 2025-05-22 RX ORDER — AMMONIUM LACTATE 12 G/100G
LOTION TOPICAL
Qty: 225 ML | Refills: 0 | Status: SHIPPED | OUTPATIENT
Start: 2025-05-22

## 2025-05-22 NOTE — PROGRESS NOTES
25  Sandra L Devicchio : 1960 Sex: female  Age: 64 y.o.    Patient was referred by Jaquan Louise DO    Chief Complaint   Patient presents with    Foot Pain    Toe Pain    Callouses     Injection did help  Neuroma   Ck Jaquan Almonte DO  LOV 5/15/25       SUBJECTIVE    History of Present Illness  The patient is a 64-year-old female who presents for evaluation of foot pain.    She reports an improvement in her foot condition, with no need for an injection at this time. However, she has been experiencing numbness in her hammertoes, which have been causing discomfort. She has been utilizing a sleeve for support and notes that her feet are currently cold due to the weather. She has been managing her calluses with regular care and does not require a shot. She has been using a pumice stone 3 times a week as part of her foot care routine.    She visited an orthopedic specialist last week due to shoulder pain, which was diagnosed as cervical radiculopathy. The pain radiates from her back to her shoulders. She was advised to consult a neck specialist and was informed that physical therapy would be recommended. She is seeking information on whether physical therapy is available at our facility. Her primary care physician is Dr. Louise. She also mentions that a physician assistant at an urgent care center had promised to provide a prescription but failed to do so.      HPI  Review of Systems  Const: Denies constitutional symptoms  Musculo: Denies symptoms other than stated above  Skin: Denies symptoms other than stated above       Current Outpatient Medications:     ammonium lactate (LAC-HYDRIN) 12 % lotion, Apply topically as needed., Disp: 225 mL, Rfl: 0    rosuvastatin (CRESTOR) 20 MG tablet, Take 1 tablet by mouth every morning, Disp: , Rfl:     valsartan (DIOVAN) 160 MG tablet, Take 1 tablet by mouth every morning, Disp: , Rfl:   No Known Allergies    Past Medical History:   Diagnosis Date

## 2025-07-22 ENCOUNTER — OFFICE VISIT (OUTPATIENT)
Dept: FAMILY MEDICINE CLINIC | Age: 65
End: 2025-07-22
Payer: MEDICARE

## 2025-07-22 ENCOUNTER — RESULTS FOLLOW-UP (OUTPATIENT)
Dept: FAMILY MEDICINE CLINIC | Age: 65
End: 2025-07-22

## 2025-07-22 VITALS
SYSTOLIC BLOOD PRESSURE: 132 MMHG | BODY MASS INDEX: 24.45 KG/M2 | HEART RATE: 105 BPM | HEIGHT: 63 IN | OXYGEN SATURATION: 99 % | DIASTOLIC BLOOD PRESSURE: 78 MMHG | WEIGHT: 138 LBS | TEMPERATURE: 97.8 F | RESPIRATION RATE: 18 BRPM

## 2025-07-22 DIAGNOSIS — M25.562 ACUTE PAIN OF LEFT KNEE: Primary | ICD-10-CM

## 2025-07-22 PROCEDURE — 1160F RVW MEDS BY RX/DR IN RCRD: CPT | Performed by: PHYSICIAN ASSISTANT

## 2025-07-22 PROCEDURE — 99204 OFFICE O/P NEW MOD 45 MIN: CPT | Performed by: PHYSICIAN ASSISTANT

## 2025-07-22 PROCEDURE — 1159F MED LIST DOCD IN RCRD: CPT | Performed by: PHYSICIAN ASSISTANT

## 2025-07-22 PROCEDURE — 1123F ACP DISCUSS/DSCN MKR DOCD: CPT | Performed by: PHYSICIAN ASSISTANT

## 2025-07-22 RX ORDER — METHYLPREDNISOLONE 4 MG/1
TABLET ORAL
Qty: 1 KIT | Refills: 0 | Status: SHIPPED | OUTPATIENT
Start: 2025-07-22

## 2025-07-22 NOTE — PROGRESS NOTES
25  Sandra L Devicchio : 1960 Sex: female  Age 64 y.o.      Subjective:  Chief Complaint   Patient presents with    Knee Injury     Fell out of bed last week.  Pain and swelling         HPI:     History of Present Illness  The patient is a 64-year-old female who presents for evaluation of left knee swelling.    She experienced a fall from her bed last Saturday, landing on her left knee. Despite the incident, she is able to stand and walk without difficulty. However, she has noticed persistent swelling in the affected knee. She reports no pain in her hip. She has been managing the condition with Aleve and applying ice to the area. She has no history of surgeries on the knee.        ROS:   Unless otherwise stated in this report the patient's positive and negative responses for review of systems for constitutional, eyes, ENT, cardiovascular, respiratory, gastrointestinal, neurological, , musculoskeletal, and integument systems and related systems to the presenting problem are either stated in the history of present illness or were not pertinent or were negative for the symptoms and/or complaints related to the presenting medical problem.  Positives and pertinent negatives as per HPI.  All others reviewed and are negative.      PMH:     Past Medical History:   Diagnosis Date    Hyperlipemia     JEEVAN (renal artery stenosis)        Past Surgical History:   Procedure Laterality Date    BREAST REDUCTION SURGERY      COSMETIC SURGERY      OVARIAN CYST REMOVAL      TONSILLECTOMY         History reviewed. No pertinent family history.    Medications:     Current Outpatient Medications   Medication Sig Dispense Refill    methylPREDNISolone (MEDROL DOSEPACK) 4 MG tablet Take by mouth. 1 kit 0    rosuvastatin (CRESTOR) 20 MG tablet Take 1 tablet by mouth every morning      valsartan (DIOVAN) 160 MG tablet Take 1 tablet by mouth every morning       No current facility-administered medications for this visit.